# Patient Record
Sex: FEMALE | Race: WHITE
[De-identification: names, ages, dates, MRNs, and addresses within clinical notes are randomized per-mention and may not be internally consistent; named-entity substitution may affect disease eponyms.]

---

## 2020-03-01 NOTE — EDM.PDOC
ED HPI GENERAL MEDICAL PROBLEM





- General


Chief Complaint: General


Stated Complaint: VIBRATION IN BODY


Time Seen by Provider: 20 09:17





- History of Present Illness


INITIAL COMMENTS - FREE TEXT/NARRATIVE: 





39-year-old female presents the emergency room with a vibration sensation in 

her abdomen.





This started yesterday.  The patient's had a significant problem with her 

sinuses in with her ears she has been on 2 courses of antibiotics and is using 

saline nose spray.  She has been on a course of Augmentin and this was followed 

up by what she thinks was Ceftin and 10-day course of each.  Her sinuses are 

little bit better she has a lot of pressure in her ears.  She has a little bit 

of stomach upset but really no abdominal pain she has had some nausea at at 

times but not now no vomiting.  No constipation she is had a few loose stools.





- Related Data


 Allergies











Allergy/AdvReac Type Severity Reaction Status Date / Time


 


No Known Allergies Allergy   Verified 20 09:00











Home Meds: 


 Home Meds





Omeprazole 20 mg PO DAILY 10/11/18 [History]


Prenatal Vits #93/Iron Fum/FA [Prenatal Formula Tablet]  10/11/18 [History]











Past Medical History


Gastrointestinal History: Reports: GERD


OB/GYN History: Reports: Pregnancy, Therapeutic 


Other OB/GYN History: complete placenta previa with this pregnancy


Psychiatric History: Reports: Anxiety





- Past Surgical History


HEENT Surgical History: Reports: Oral Surgery





Social & Family History





- Tobacco Use


Smoking Status *Q: Former Smoker


Used Tobacco, but Quit: Yes


Month/Year Tobacco Last Used: 4 months





ED ROS GENERAL





- Review of Systems


Review Of Systems: See Below


Constitutional: Reports: No Symptoms


HEENT: Reports: Ear Pain, Rhinitis, Sinus Problem


Respiratory: Reports: No Symptoms


Cardiovascular: Reports: No Symptoms


Endocrine: Reports: No Symptoms


GI/Abdominal: Reports: Other (Vague symptoms, loose stools but not frequent 

occasional nausea she thinks this is worse when her anxiety is worse)


: Reports: No Symptoms


Musculoskeletal: Reports: No Symptoms


Neurological: Reports: Dizziness.  Denies: Other


Psychiatric: Reports: Anxiety





ED EXAM, GENERAL





- Physical Exam


Exam: See Below


Exam Limited By: No Limitations


General Appearance: Alert, No Apparent Distress


Eye Exam: Bilateral Eye: Normal Inspection


Ears: Normal External Exam, Normal Canal, Hearing Grossly Normal, Normal TMs


Ear Exam: Bilateral Ear: Other (She has pressure behind both tympanic membranes 

much worse on the left compared to the right)


Nose: Normal Inspection, Clear Rhinorrhea


Throat/Mouth: Normal Inspection, Normal Lips, Normal Teeth, Normal Gums, Normal 

Oropharynx, Normal Voice, No Airway Compromise


Head: Atraumatic, Normocephalic


Neck: Normal Inspection, Supple, Non-Tender, Full Range of Motion.  No: 

Lymphadenopathy (L), Lymphadenopathy (R)


Respiratory/Chest: No Respiratory Distress, Lungs Clear, Normal Breath Sounds


Cardiovascular: Regular Rate, Rhythm, No Edema, No Murmur


GI/Abdominal: Normal Bowel Sounds, Soft, Non-Tender, Other (No abdominal bruit 

no palpable masses in the abdomen abdominal exam is essentially unremarkable)


Back Exam: Normal Inspection.  No: CVA Tenderness (L), CVA Tenderness (R)





Course





- Vital Signs


Last Recorded V/S: 


 Last Vital Signs











Temp  36.3 C   20 08:56


 


Pulse  84   20 08:56


 


Resp  16   20 08:56


 


BP  135/93 H  20 08:56


 


Pulse Ox  98   20 08:56














- Orders/Labs/Meds


Meds: 


Medications














Discontinued Medications














Generic Name Dose Route Start Last Admin





  Trade Name Freq  PRN Reason Stop Dose Admin


 


Meclizine HCl  25 mg  20 09:32  20 09:40





  Antivert  PO  20 09:33  25 mg





  ONETIME ONE   Administration





     





     





     





     














- Re-Assessments/Exams


Free Text/Narrative Re-Assessment/Exam: 





20 10:18


Recently had a head CT this was unremarkable except for sinus changes.  At this 

point I am not positive I wonder if some of this sensation could be coming from 

her ears and the inner ear congestion sinus problems that she is having she 

describes this vibratory sensation is radiated into her back and into her neck 

and into her ears.  I am not sure laboratory evaluation or further imaging is 

getting help me at this point.  I discussed this with the patient I do not 

think it is anything serious at this time however quite bothersome.  I 

discussed imaging and further laboratory evaluation the patient agrees this 

probably will not help we will put her on a course of meclizine to see if this 

helps.  Patient agrees to return in 24 hours if not better sooner if getting 

worse.  Patient does get anxious over this however she denies wanting anything 

for anxiety.











Departure





- Departure


Time of Disposition: 10:20


Disposition: Home, Self-Care 01


Clinical Impression: 


 Sinus pressure, Inner ear inflammation








- Discharge Information


Referrals: 


Marcelo Conway MD [Primary Care Provider] - 


Forms:  ED Department Discharge


Additional Instructions: 


Return to the emergency room with any questions problems or worsening symptoms.

  





Return in 24 hours if not better.  





Try the meclizine this is over-the-counter 25 mg every 6 hours and see if this 

helps.  





Do the sinus irrigation as we discussed.  





This will at least help with your sinus issues.  





Try clearing your ears every couple hours while awake.  Do not push to the 

point of significant discomfort.





Sepsis Event Note





- Evaluation


Sepsis Screening Result: No Definite Risk





- Focused Exam


Vital Signs: 


 Vital Signs











  Temp Pulse Resp BP Pulse Ox


 


 20 08:56  36.3 C  84  16  135/93 H  98











Date Exam was Performed: 20


Time Exam was Performed: 10:14

## 2020-07-02 ENCOUNTER — HOSPITAL ENCOUNTER (EMERGENCY)
Dept: HOSPITAL 41 - JD.ED | Age: 40
Discharge: HOME | End: 2020-07-02
Payer: COMMERCIAL

## 2020-07-02 DIAGNOSIS — Z79.899: ICD-10-CM

## 2020-07-02 DIAGNOSIS — M54.41: Primary | ICD-10-CM

## 2020-07-02 DIAGNOSIS — F41.9: ICD-10-CM

## 2020-07-02 DIAGNOSIS — K21.9: ICD-10-CM

## 2020-07-02 DIAGNOSIS — F17.210: ICD-10-CM

## 2020-07-02 NOTE — EDM.PDOC
ED HPI GENERAL MEDICAL PROBLEM





- General


Chief Complaint: Back Pain or Injury


Stated Complaint: BACK PAIN


Time Seen by Provider: 20 11:01


Source of Information: Reports: Patient, RN Notes Reviewed


History Limitations: Reports: No Limitations





- History of Present Illness


INITIAL COMMENTS - FREE TEXT/NARRATIVE: 





Patient is a 40-year-old female who presents to the ED for evaluation of her 

lower back pain.  Patient states that when she woke up this morning, she is 

having some pain to her lower mid back area, that radiates to her right lateral 

leg.  Patient states she has a history of GERD, and that she sleeps inclined, 

but she was laying on her belly inclined, so she thinks she could have tweaked 

her back while laying on her belly in this position.  She did states she had 

some tightness upon awakening, but then the pain did intensify when she went to 

vacuum and about her daily projects.  She notes now that the pain is more of a 

dull ache, staying in the middle of her back.  She states that walking seems to 

worsen this, she did take the falls milligrams of Tylenol for pain relief about 

30 minutes before coming to the ER.  Patient denies any other sick-like sympt

oms, fever/chills, chest pain/shortness of breath, cough, or any urinary issues 

like dysuria, frequency or urgency, she does not have a history of kidney 

stones.  


  ** Middle Back


Pain Score (Numeric/FACES): 9





- Related Data


                                    Allergies











Allergy/AdvReac Type Severity Reaction Status Date / Time


 


No Known Allergies Allergy   Verified 20 10:27











Home Meds: 


                                    Home Meds





Omeprazole 20 mg PO DAILY 10/11/18 [History]


Citalopram Hydrobromide [Celexa] 10 mg PO DAILY 20 [History]


Orphenadrine [Norflex] 100 mg PO BID PRN #20 tab 20 [Rx]


predniSONE 20 mg PO ASDIRECTED #15 tab 20 [Rx]











Past Medical History


HEENT History: Reports: Impaired Vision


Gastrointestinal History: Reports: GERD


OB/GYN History: Reports: Pregnancy, Therapeutic 


Other OB/GYN History: complete placenta previa with this pregnancy


Psychiatric History: Reports: Anxiety





- Past Surgical History


HEENT Surgical History: Reports: Oral Surgery


Female  Surgical History: Reports:  Section





Social & Family History





- Tobacco Use


Smoking Status *Q: Current Every Day Smoker


Years of Tobacco use: 10


Packs/Tins Daily: 0.2





- Caffeine Use


Caffeine Use: Reports: None





ED ROS GENERAL





- Review of Systems


Review Of Systems: Comprehensive ROS is negative, except as noted in HPI.





ED EXAM,LOWER BACK PAIN/INJURY





- Physical Exam


Exam: See Below


Exam Limited By: No Limitations


General Appearance: Alert, WD/WN, No Apparent Distress


Respiratory/Chest: No Respiratory Distress, Lungs Clear, Normal Breath Sounds, 

No Accessory Muscle Use, Chest Non-Tender


Cardiovascular: Normal Peripheral Pulses, Regular Rate, Rhythm, No Murmur


GI/Abdominal: Normal Bowel Sounds, Soft, Non-Tender, No Distention, No Mass


Extremities: Normal Inspection, Normal Capillary Refill


Neurological: Alert, Normal Mood/Affect, Normal Dorsiflexion, CN II-XII Intact, 

Normal Plantar Flexion, Normal Gait, No Motor/Sensory Deficits, Oriented x 3, 

Straight Leg Raise (R) (slightly positive).  No: Straight Leg Raise (L), Saddle 

Anesthesia, Difficulty Walking


Psychiatric: Normal Affect, Normal Mood


Skin Exam: Warm, Dry, Intact, Normal Color, No Rash





Course





- Vital Signs


Last Recorded V/S: 





                                Last Vital Signs











Temp  98.1 F   20 10:28


 


Pulse  70   20 10:28


 


Resp  13   20 10:28


 


BP  116/75   20 10:28


 


Pulse Ox  100   20 10:28














- Re-Assessments/Exams


Free Text/Narrative Re-Assessment/Exam: 





20 11:30


Presents to the ED for evaluation of her lower back pain.  This does have a 

component of sciatic in nature.  She cannot take ibuprofen, due to her GERD, we 

will start her on prednisone and Norflex, if her stomach cannot tolerate the 

prednisone, she can cease use and follow-up with her primary care provider for 

change in medications.





Departure





- Departure


Time of Disposition: 11:31


Disposition: Home, Self-Care 01


Condition: Good


Clinical Impression: 


Low back pain


Qualifiers:


 Chronicity: acute Back pain laterality: midline Sciatica presence: with 

sciatica Sciatica laterality: sciatica of right side Qualified Code(s): M54.41 -

 Lumbago with sciatica, right side








- Discharge Information


*PRESCRIPTION DRUG MONITORING PROGRAM REVIEWED*: No


*COPY OF PRESCRIPTION DRUG MONITORING REPORT IN PATIENT ROB: No


Prescriptions: 


Orphenadrine [Norflex] 100 mg PO BID PRN #20 tab


 PRN Reason: Spasms


predniSONE 20 mg PO ASDIRECTED #15 tab


Instructions:  Back Exercises, Easy-to-Read, Radicular Pain


Referrals: 


Chelsie Connolly PA-C [Primary Care Provider] - 


Additional Instructions: 


You were evaluated in the ER today regarding your low back pain.





This is likely a aggravation of the sciatic nerve causing these symptoms.  

Treatment for this is prednisone, 20 mg 2 times a day for 5 days, then 20 mg 

once a day for 5 days.  You also given a prescription for muscle relaxer called 

Norflex, 1 tab 2 times a day as needed for further muscle spasms.





You may try ice/heat to the area to provide further pain relief, try to refrain 

from any movements that seem to aggravate the pain.





If the prednisone seems to not agree with your stomach, you do not have to take 

it, and can try the Norflex only.  I would recommend following up with your 

primary care provider if you have to stop taking the prednisone for further 

management of your back pain.





Please return to the ER at any time if your symptoms change or worsen.





Sepsis Event Note (ED)





- Evaluation


Sepsis Screening Result: No Definite Risk





- Focused Exam


Vital Signs: 





                                   Vital Signs











  Temp Pulse Resp BP Pulse Ox


 


 20 10:28  98.1 F  70  13  116/75  100

## 2022-06-02 ENCOUNTER — HOSPITAL ENCOUNTER (OUTPATIENT)
Dept: HOSPITAL 41 - JD.SDS | Age: 42
Discharge: HOME | End: 2022-06-02
Attending: SURGERY
Payer: COMMERCIAL

## 2022-06-02 DIAGNOSIS — E66.9: ICD-10-CM

## 2022-06-02 DIAGNOSIS — D64.9: ICD-10-CM

## 2022-06-02 DIAGNOSIS — Z79.899: ICD-10-CM

## 2022-06-02 DIAGNOSIS — F41.9: ICD-10-CM

## 2022-06-02 DIAGNOSIS — K64.8: ICD-10-CM

## 2022-06-02 DIAGNOSIS — E55.9: ICD-10-CM

## 2022-06-02 DIAGNOSIS — F17.210: ICD-10-CM

## 2022-06-02 DIAGNOSIS — F32.A: ICD-10-CM

## 2022-06-02 DIAGNOSIS — H54.7: ICD-10-CM

## 2022-06-02 DIAGNOSIS — K21.9: ICD-10-CM

## 2022-06-02 DIAGNOSIS — K31.819: ICD-10-CM

## 2022-06-02 DIAGNOSIS — K31.7: Primary | ICD-10-CM

## 2022-06-02 DIAGNOSIS — K31.89: ICD-10-CM

## 2022-06-02 PROCEDURE — 43239 EGD BIOPSY SINGLE/MULTIPLE: CPT

## 2022-06-02 PROCEDURE — 45378 DIAGNOSTIC COLONOSCOPY: CPT
